# Patient Record
Sex: MALE | Race: BLACK OR AFRICAN AMERICAN | NOT HISPANIC OR LATINO | Employment: FULL TIME | URBAN - METROPOLITAN AREA
[De-identification: names, ages, dates, MRNs, and addresses within clinical notes are randomized per-mention and may not be internally consistent; named-entity substitution may affect disease eponyms.]

---

## 2022-01-14 ENCOUNTER — HISTORICAL (OUTPATIENT)
Dept: ADMINISTRATIVE | Facility: HOSPITAL | Age: 42
End: 2022-01-14

## 2022-01-14 LAB — SARS-COV-2 RNA RESP QL NAA+PROBE: NEGATIVE

## 2024-02-23 ENCOUNTER — HOSPITAL ENCOUNTER (EMERGENCY)
Facility: HOSPITAL | Age: 44
Discharge: HOME | End: 2024-02-23
Attending: EMERGENCY MEDICINE

## 2024-02-23 VITALS
TEMPERATURE: 98 F | OXYGEN SATURATION: 100 % | DIASTOLIC BLOOD PRESSURE: 101 MMHG | WEIGHT: 141.13 LBS | SYSTOLIC BLOOD PRESSURE: 172 MMHG | HEART RATE: 89 BPM | RESPIRATION RATE: 18 BRPM

## 2024-02-23 DIAGNOSIS — M79.641 RIGHT HAND PAIN: Primary | ICD-10-CM

## 2024-02-23 DIAGNOSIS — M19.031 ARTHRITIS OF WRIST, RIGHT: ICD-10-CM

## 2024-02-23 PROCEDURE — 96372 THER/PROPH/DIAG INJ SC/IM: CPT | Performed by: NURSE PRACTITIONER

## 2024-02-23 PROCEDURE — 99284 EMERGENCY DEPT VISIT MOD MDM: CPT | Mod: 25

## 2024-02-23 PROCEDURE — 63600175 PHARM REV CODE 636 W HCPCS: Performed by: NURSE PRACTITIONER

## 2024-02-23 RX ORDER — DICLOFENAC SODIUM 75 MG/1
75 TABLET, DELAYED RELEASE ORAL 2 TIMES DAILY PRN
Qty: 20 TABLET | Refills: 0 | Status: SHIPPED | OUTPATIENT
Start: 2024-02-23

## 2024-02-23 RX ORDER — KETOROLAC TROMETHAMINE 30 MG/ML
30 INJECTION, SOLUTION INTRAMUSCULAR; INTRAVENOUS
Status: COMPLETED | OUTPATIENT
Start: 2024-02-23 | End: 2024-02-23

## 2024-02-23 RX ADMIN — KETOROLAC TROMETHAMINE 30 MG: 30 INJECTION, SOLUTION INTRAMUSCULAR; INTRAVENOUS at 07:02

## 2024-02-23 NOTE — Clinical Note
"Ander"Latrice Shaffer was seen and treated in our emergency department on 2/23/2024.  He may return to work on 02/24/2024.       If you have any questions or concerns, please don't hesitate to call.      Tremaine Larson MD"

## 2024-02-23 NOTE — Clinical Note
"Ander Leslie"Deena was seen and treated in our emergency department on 2/23/2024.  He may return to work on 02/26/2024.       If you have any questions or concerns, please don't hesitate to call.      Jason Vallecillo, ACNP"

## 2024-02-23 NOTE — Clinical Note
"Ander Leslie" Deena was seen and treated in our emergency department on 2/23/2024.  He may return to work on 02/24/2024.       If you have any questions or concerns, please don't hesitate to call.      JOHN Diana RN    "

## 2024-02-24 NOTE — ED PROVIDER NOTES
Encounter Date: 2/23/2024       History     Chief Complaint   Patient presents with    Hand Pain     RT HAND PAIN AND SWELLING X 2 DAYS.  DENIES INJURY.       The patient presents with right hand pain and swelling for 3 days. No known injury. History of ganglion cyst.      Review of patient's allergies indicates:   Allergen Reactions    Clindamycin      Past Medical History:   Diagnosis Date    Hypertension      History reviewed. No pertinent surgical history.  History reviewed. No pertinent family history.  Social History     Tobacco Use    Smoking status: Every Day     Current packs/day: 1.00     Average packs/day: 1 pack/day for 24.1 years (24.1 ttl pk-yrs)     Types: Cigarettes     Start date: 2000    Smokeless tobacco: Never     Review of Systems   Constitutional:  Negative for fever.   HENT:  Negative for sore throat.    Respiratory:  Negative for shortness of breath.    Cardiovascular:  Negative for chest pain.   Gastrointestinal:  Negative for nausea.   Genitourinary:  Negative for dysuria.   Musculoskeletal:  Positive for arthralgias. Negative for back pain.   Skin:  Negative for rash.   Neurological:  Negative for weakness.   Hematological:  Does not bruise/bleed easily.   All other systems reviewed and are negative.      Physical Exam     Initial Vitals [02/23/24 1900]   BP Pulse Resp Temp SpO2   (!) 172/101 89 18 97.9 °F (36.6 °C) 100 %      MAP       --         Physical Exam    Nursing note and vitals reviewed.  Constitutional: He appears well-developed and well-nourished.   HENT:   Head: Normocephalic and atraumatic.   Neck: Neck supple.   Normal range of motion.  Cardiovascular:  Normal rate, regular rhythm, normal heart sounds and intact distal pulses.           Pulmonary/Chest: Effort normal and breath sounds normal. He has no decreased breath sounds.   Abdominal: Abdomen is soft and flat. Bowel sounds are normal. There is no abdominal tenderness.   Musculoskeletal:         General: Normal range of  motion.      Cervical back: Normal range of motion and neck supple.      Comments: Firm mild tender swelling to dorsal aspect right hand, decreased rom d/t pain, brisk cap refill     Neurological: He is alert and oriented to person, place, and time. He has normal strength.   Skin: Skin is warm and dry.   Psychiatric: He has a normal mood and affect.         ED Course   Procedures  Labs Reviewed - No data to display       Imaging Results              X-Ray Wrist Complete Right (Final result)  Result time 02/23/24 20:27:39      Final result by Stas Marquez MD (02/23/24 20:27:39)                   Impression:      Interval progression of carpal and metacarpal cystic formations without exclusion of some erosive changes.  Findings may be related to inflammatory arthritis.  Please correlate clinically.  Please further assess with MRI of the hand on non emergent basis.      Electronically signed by: Stas Marquez  Date:    02/23/2024  Time:    20:27               Narrative:    EXAMINATION:  XR WRIST COMPLETE 3 VIEWS RIGHT    CLINICAL HISTORY:  Pain in right hand    TECHNIQUE:  Right wrist three views    COMPARISON:  March 15, 2021.    FINDINGS:  There are multiple cystic changes involve the carpal bones there is more pronounced involvement of distal carpal row subjacent to the carpometacarpal articulations.  There are also cystic changes which involve the 2nd through 4th metacarpal bases which show interval progression.  Possibility of associated erosive changes are without exclusion.  No definite acute appearing fracture or dislocation identified.                                       X-Ray Hand 3 view Right (Final result)  Result time 02/23/24 20:29:43      Final result by Stas Marquez MD (02/23/24 20:29:43)                   Impression:      Multiple cystic formation without exclusion of erosive changes.  Findings may be related to inflammatory arthritis.  On non emergent basis, follow-up MRI may be  considered.      Electronically signed by: Stas Marquez  Date:    02/23/2024  Time:    20:29               Narrative:    EXAMINATION:  XR HAND COMPLETE 3 VIEW RIGHT    CLINICAL HISTORY:  right hand pain;    TECHNIQUE:  Three views    COMPARISON:  October 26, 2017    FINDINGS:  There are cystic formations involving multiple carpal bones and the 2nd through 4th bases of the metacarpals.  These findings show interval progression and may be related to inflammatory arthritis.  Associated erosive changes are without exclusion.  No definite acute appearing fracture or dislocation identified.                        Wet Read by Jason Vallecillo ACNP (02/23/24 20:26:44, [facility], Emergency Medicine)    Nothing acute                                     Medications   ketorolac injection 30 mg (30 mg Intramuscular Given 2/23/24 1939)     Medical Decision Making  The patient presents with right hand pain and swelling for 3 days. No known injury. History of ganglion cyst.    Cystic changes to carpal bones. Will refer to medicine clinic per request for a pcp and ortho clinic.    Amount and/or Complexity of Data Reviewed  Radiology: ordered and independent interpretation performed. Decision-making details documented in ED Course.    Risk  Prescription drug management.      Additional MDM:   Differential Diagnosis:   At this time differential diagnosis is but not limited to ganglion cyst, fracture, sprain              ED Course as of 02/23/24 2059 Fri Feb 23, 2024 2047 X-Ray Hand 3 view Right     Impression:     Multiple cystic formation without exclusion of erosive changes.  Findings may be related to inflammatory arthritis.  On non emergent basis, follow-up MRI may be considered.   [RB]   2048 X-Ray Wrist Complete Right  Impression:     Interval progression of carpal and metacarpal cystic formations without exclusion of some erosive changes.  Findings may be related to inflammatory arthritis.  Please  correlate clinically.  Please further assess with MRI of the hand on non emergent basis.   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 Provider    diclofenac (VOLTAREN) 75 MG EC tablet Take 1 tablet (75 mg total) by mouth 2 (two) times daily as needed (pain). 20 tablet 2/23/2024 -- Jason Vallecillo ACNP          Follow-up Information       Follow up With Specialties Details Why Contact Info    referral sent to medicine clinic per request for a primary care provider        referral sent to orthopedic clinic        [facility] Emergency Medicine  If symptoms worsen [address] [id number] [phone]             Jason Vallecillo ACNP  02/23/24 2319